# Patient Record
Sex: MALE | Race: WHITE
[De-identification: names, ages, dates, MRNs, and addresses within clinical notes are randomized per-mention and may not be internally consistent; named-entity substitution may affect disease eponyms.]

---

## 2018-10-24 ENCOUNTER — HOSPITAL ENCOUNTER (INPATIENT)
Dept: HOSPITAL 12 - SRC | Age: 38
LOS: 6 days | Discharge: NURSING, REHAB OR LONG-TERM CARE | DRG: 895 | End: 2018-10-30
Attending: INTERNAL MEDICINE | Admitting: INTERNAL MEDICINE
Payer: COMMERCIAL

## 2018-10-24 VITALS — DIASTOLIC BLOOD PRESSURE: 65 MMHG | SYSTOLIC BLOOD PRESSURE: 112 MMHG

## 2018-10-24 VITALS — HEIGHT: 73 IN | WEIGHT: 174 LBS | BODY MASS INDEX: 23.06 KG/M2

## 2018-10-24 DIAGNOSIS — F17.210: ICD-10-CM

## 2018-10-24 DIAGNOSIS — E87.1: ICD-10-CM

## 2018-10-24 DIAGNOSIS — F41.1: ICD-10-CM

## 2018-10-24 DIAGNOSIS — R73.9: ICD-10-CM

## 2018-10-24 DIAGNOSIS — R00.1: ICD-10-CM

## 2018-10-24 DIAGNOSIS — E88.09: ICD-10-CM

## 2018-10-24 DIAGNOSIS — F15.21: ICD-10-CM

## 2018-10-24 DIAGNOSIS — Z79.899: ICD-10-CM

## 2018-10-24 DIAGNOSIS — E83.51: ICD-10-CM

## 2018-10-24 DIAGNOSIS — F11.23: Primary | ICD-10-CM

## 2018-10-24 DIAGNOSIS — F90.9: ICD-10-CM

## 2018-10-24 DIAGNOSIS — B19.20: ICD-10-CM

## 2018-10-24 DIAGNOSIS — F32.9: ICD-10-CM

## 2018-10-24 DIAGNOSIS — F13.239: ICD-10-CM

## 2018-10-24 DIAGNOSIS — F15.23: ICD-10-CM

## 2018-10-24 DIAGNOSIS — Z91.89: ICD-10-CM

## 2018-10-24 LAB
ALP SERPL-CCNC: 53 U/L (ref 50–136)
ALT SERPL W/O P-5'-P-CCNC: 67 U/L (ref 16–63)
AST SERPL-CCNC: 24 U/L (ref 15–37)
BASOPHILS # BLD AUTO: 0 K/UL (ref 0–8)
BASOPHILS NFR BLD AUTO: 0.6 % (ref 0–2)
BILIRUB SERPL-MCNC: 0.4 MG/DL (ref 0.2–1)
BUN SERPL-MCNC: 14 MG/DL (ref 7–18)
CHLORIDE SERPL-SCNC: 100 MMOL/L (ref 98–107)
CO2 SERPL-SCNC: 29 MMOL/L (ref 21–32)
CREAT SERPL-MCNC: 1.1 MG/DL (ref 0.6–1.3)
EOSINOPHIL # BLD AUTO: 0.5 K/UL (ref 0–0.7)
EOSINOPHIL NFR BLD AUTO: 9.5 % (ref 0–7)
ETHANOL SERPL-MCNC: < 3 MG/DL (ref 0–0)
GLUCOSE SERPL-MCNC: 136 MG/DL (ref 74–106)
HCT VFR BLD AUTO: 38.7 % (ref 36.7–47.1)
HGB BLD-MCNC: 13.1 G/DL (ref 12.5–16.3)
LYMPHOCYTES # BLD AUTO: 2.3 K/UL (ref 20–40)
LYMPHOCYTES NFR BLD AUTO: 43.8 % (ref 20.5–51.5)
MAGNESIUM SERPL-MCNC: 2 MG/DL (ref 1.8–2.4)
MCH RBC QN AUTO: 32.1 UUG (ref 23.8–33.4)
MCHC RBC AUTO-ENTMCNC: 34 G/DL (ref 32.5–36.3)
MCV RBC AUTO: 94.5 FL (ref 73–96.2)
MONOCYTES # BLD AUTO: 0.3 K/UL (ref 2–10)
MONOCYTES NFR BLD AUTO: 5.7 % (ref 0–11)
NEUTROPHILS # BLD AUTO: 2.2 K/UL (ref 1.8–8.9)
NEUTROPHILS NFR BLD AUTO: 40.4 % (ref 38.5–71.5)
PLATELET # BLD AUTO: 285 K/UL (ref 152–348)
POTASSIUM SERPL-SCNC: 3.8 MMOL/L (ref 3.5–5.1)
RBC # BLD AUTO: 4.09 MIL/UL (ref 4.06–5.63)
TSH SERPL DL<=0.005 MIU/L-ACNC: 2.05 MIU/ML (ref 0.36–3.74)
WBC # BLD AUTO: 5.3 K/UL (ref 3.6–10.2)
WS STN SPEC: 7.3 G/DL (ref 6.4–8.2)

## 2018-10-24 PROCEDURE — G0480 DRUG TEST DEF 1-7 CLASSES: HCPCS

## 2018-10-24 PROCEDURE — HZ2ZZZZ DETOXIFICATION SERVICES FOR SUBSTANCE ABUSE TREATMENT: ICD-10-PCS | Performed by: INTERNAL MEDICINE

## 2018-10-24 RX ADMIN — GABAPENTIN SCH MG: 400 CAPSULE ORAL at 23:53

## 2018-10-24 NOTE — NUR
ADMISSION NOTE:

The patient admitted on unit for Benzodiazepines and Opioid withdrawal on 10/24/2018 at 
2113.  Patient is alert and oriented x4.  Patient is cooperative, with steady gate, and soft 
clear speech.  Patient noted with moderate intoxication, drowsiness, restlessness, and 
fatigue.  Patient reports NKA.  Patient denies Fall or Seizures History.   Patient reports 
Past Medical History: Anxiety, Depression,  history of overdose with last episode 5 days ago 
:"Paramedics came to my house, and administered Narcan to me".   



Substance Use History:



1. Xanax- patient started using prescribed Xanax 7 months ago-  Currently using 2 mg PO 
every day for the past 7 months.  Last dose was 4 mg on 10/24/2018 at 1500.



2. Adderall - patient started using prescribed Adderall 7 months ago-  Currently using 30 mg 
PO every day for the past 7 months.  Last dose was 30 mg on 10/24/2018 at 1500.



3. Subutex - patient started using prescribed Subutex 7 months ago-  Currently using 8 mg PO 
every day for the past 7 months.  Last dose was 8 mg on 10/24/2018 at 1500.



4. Heroin - patient started using Heroin via IV 10 years ago-  Currently using 1 gram via IV 
 occasionally for the past two weeks.  Last dose was I gram via IV on 10/19/2018. 



5. Methamphetamine - patient started using Methamphetamine via IV 12 years ago-  Currently 
using 1 gram via IV  occasionally for the past two weeks.  Last dose was I gram via IV on 
10/19/2018. 



Patient reports Treatment History:



1."[facility]" June,2018 for 7 days, [place], [place],



2."[facility]" January,2018 for 30 days, [place], [place].



3."[facility]", November, 2017 for 22 days. [place], [place].



4."[facility]", February, 2017 for 45 days, [place]. 



Patient verbalized, ""My longest period of sobriety was seven months: 01/2015 to 08/2015".



Patient reports, "I am feeling very sick if I am not using drugs, I experience anxiety, 
agitation, depression, irritation, nervousness, abdominal pain, nausea, vomiting, body pain, 
headache, dizziness, tremors, restlessness, and fatigue".  Patient explained, "I came today 
because my life now is out of control: I am lost everything, and I am scared now of losing 
my life.  I want to get sober to be productive and to save my life".  



Patient reports smoking history, "First smoking was in 1997.  Currently I am smoking 1 pack 
every day".

Smoking  cessation education provided to patient.  Patient verbalized understanding.



Patient has no Primary Care Physician and Psychiatrist.  Patient not brought home 
medications.



Initial body assessment done.  VS: T: 97.6, HR: 69, RR: 18, RA O2Sat: 98%, BP: 112/65, pain  
level:  0/10.  Respirations are even and unlabored.  Abdomen is soft, non-tender, bowel 
sounds are active in all four quadrants.  Skin check done: Skin is warm, and dry to touch.  
Patient has right big toe and  right ankle lateral abrasions.  



UDS Labs collected and sent to lab.  Blood labs done.



Encourage to intake fluids as tolerated.  Encouraged to attend group activates.  Safe and 
calm environment provided.  All needs met.  Safety measures in place: Call light within 
reach, bed is locked in the lowest position, padded bed rails up bilaterally.  Will continue 
to monitor. 




-------------------------------------------------------------------------------

Addendum: 10/25/18 at 1441 by MELISSA LAW RN

-------------------------------------------------------------------------------

Update on Substance Use/Treatment History:



Patient's UDS resulted positive for barbiturates. Per patient, he has not knowingly taken 
barbiturates. However, he states he was in an IOP in [place] (can't recall name) 2-3 weeks 
ago, and he believes he might have been given phenobarbital while there. He claims he was 
also given his prescription Adderall and Subutex while there.

## 2018-10-24 NOTE — NUR
PRE-ADMISSION NOTE

Patient seen in intake office.  First assessment done.  Patient is alert and oriented x4.  
Patient is cooperative, with steady gate, and soft clear speech.  Patient noted with 
moderate intoxication, drowsiness, restlessness, and fatigue.  VS: T: 97.6, HR: 69, RR: 18, 
RA O2Sat: 98%, BP: 112/65, pain  level:  0/10.  Patient reports NKA.  Patient denies Fall or 
Seizures History.  Policy and rights explained to patient,  patient verbalized 
understanding. Full assessment will be done on unit.

## 2018-10-25 VITALS — SYSTOLIC BLOOD PRESSURE: 92 MMHG | DIASTOLIC BLOOD PRESSURE: 60 MMHG

## 2018-10-25 VITALS — DIASTOLIC BLOOD PRESSURE: 77 MMHG | SYSTOLIC BLOOD PRESSURE: 99 MMHG

## 2018-10-25 VITALS — SYSTOLIC BLOOD PRESSURE: 92 MMHG | DIASTOLIC BLOOD PRESSURE: 57 MMHG

## 2018-10-25 VITALS — SYSTOLIC BLOOD PRESSURE: 99 MMHG | DIASTOLIC BLOOD PRESSURE: 64 MMHG

## 2018-10-25 VITALS — SYSTOLIC BLOOD PRESSURE: 108 MMHG | DIASTOLIC BLOOD PRESSURE: 68 MMHG

## 2018-10-25 LAB
AMPHETAMINES UR QL SCN>1000 NG/ML: POSITIVE
BARBITURATES UR QL SCN: POSITIVE
COCAINE UR QL SCN: NEGATIVE
OPIATES UR QL SCN: NEGATIVE
PCP UR QL SCN>25 NG/ML: NEGATIVE
THC UR QL SCN>50 NG/ML: NEGATIVE

## 2018-10-25 PROCEDURE — HZ41ZZZ GROUP COUNSELING FOR SUBSTANCE ABUSE TREATMENT, BEHAVIORAL: ICD-10-PCS

## 2018-10-25 PROCEDURE — HZ31ZZZ INDIVIDUAL COUNSELING FOR SUBSTANCE ABUSE TREATMENT, BEHAVIORAL: ICD-10-PCS

## 2018-10-25 RX ADMIN — BUPRENORPHINE HYDROCHLORIDE SCH MG: 2 TABLET SUBLINGUAL at 11:16

## 2018-10-25 RX ADMIN — DIAZEPAM SCH MG: 10 TABLET ORAL at 14:21

## 2018-10-25 RX ADMIN — THERA TABS SCH UDTAB: TAB at 09:46

## 2018-10-25 RX ADMIN — GABAPENTIN SCH MG: 400 CAPSULE ORAL at 17:07

## 2018-10-25 RX ADMIN — HYDROXYZINE PAMOATE PRN MG: 25 CAPSULE ORAL at 23:21

## 2018-10-25 RX ADMIN — BUPRENORPHINE HYDROCHLORIDE SCH MG: 2 TABLET SUBLINGUAL at 20:49

## 2018-10-25 RX ADMIN — DIAZEPAM SCH MG: 10 TABLET ORAL at 09:46

## 2018-10-25 RX ADMIN — METHOCARBAMOL TABLETS PRN MG: 750 TABLET, COATED ORAL at 23:21

## 2018-10-25 RX ADMIN — GABAPENTIN SCH MG: 400 CAPSULE ORAL at 09:45

## 2018-10-25 RX ADMIN — OXCARBAZEPINE SCH MG: 300 TABLET, FILM COATED ORAL at 17:06

## 2018-10-25 RX ADMIN — DIAZEPAM SCH MG: 10 TABLET ORAL at 20:49

## 2018-10-25 RX ADMIN — OXCARBAZEPINE SCH MG: 300 TABLET, FILM COATED ORAL at 09:46

## 2018-10-25 RX ADMIN — GABAPENTIN SCH MG: 400 CAPSULE ORAL at 12:20

## 2018-10-25 NOTE — NUR
START OF SHIFT NOTE:

The patient, 38 year male, continues 4 Day Valium and 3 Days Subutex Tapers ordered for 
Benzodiazepines and Opioid withdrawal. Today is first day.  Patient tolerated well.  
Withdrawal symptoms will be monitoring closely.  Patient is alert and oriented x4, calm and 
cooperative.  Mood anxious, and flat affect. The most recent COWS=10 at 1600, CIWA=10 at 
1600: Patient noted with moderate symptoms of withdrawal such as anxiety, agitation, 
irritability, nervousness, nasal congestion, generalized mild body aches, abdominal cramps, 
bilateral tremors, that can be felt  but not observe, restlessness, and fatigue.  PRN 
Medications was not administrated during day shift, per day shift nurse report.  Patient 
attended group activities.  Encouraged to intake fluids as tolerated.  Encouraged to attend 
group activities.  Safe and calm environment provide.  All needs met.  Safety measures in 
place: Call light within reach, bed is in lowest position and locked, padded bed rails up 
x2.  Will continue to monitor closely.

## 2018-10-25 NOTE — NUR
COWS CIWA ASSESSMENT



cows=7 ciwa=10. Anxious and restless. Not able to lay/sit still. Pressured speech. Bilateral 
hand tremors. Irritable and agitated. Complaints of generalized discomfort.

## 2018-10-25 NOTE — NUR
LATE MEDICATION



Subutex dose given. Cows=14. Anxious and restless. Very irritable and agitated. Complaints 
of intermittent chills and perspiration. Body aches. Generalized discomfort.

## 2018-10-25 NOTE — NUR
END OF SHIFT NOTE:

This report given for patient, 38 year male, admitted for medical supervision from 
Benzodiazepines and Opioid withdrawal on 10/24/2018 at 2113, ordered 4 Day Valium taper, and 
3 day Subutex Taper.  Patient is alert and oriented x4, cooperative, soft clear speech, and 
with steady gate.  Patient is fully ambulatory with low VTE.  Patient admitted with moderate 
intoxication, drowsiness, restlessness, and fatigue.  COWS/CIWA deferred  at 0400 for sleep. 
 Patient will be assessed when patient is awake.  Patient reports NKA.  Patient denies Fall 
or Seizures History.  Past Medical History: Anxiety, Depression, History of overdose with 
last episode 5 days ago.  VSWNL.  Respirations are even and unlabored.  Abdomen is soft and 
non-tender, bowel sounds are active in all four quadrants.  Skin is warm, and dry to touch.  
Patient has right big toe and  right ankle lateral abrasions.  Patient slept for 5 hours, 
intake 500 ml, output: Voided x1.  No PRN Medications administrated.  Encouraged to intake 
fluids as tolerated.   Encouraged to attend group activates.  Safe and calm environment 
provided.  All needs met.  Safety measures in place: Call light within reach, bed is locked 
in the lowest position, bed rails up bilaterally.  Endorsed to day shift nurse.

## 2018-10-25 NOTE — NUR
COWS/CIWA ASSESSMENT

COWS=12, CIWA=11.  Patient noted anxious, agitated, irritated, nervousness, with bilateral 
tremors, sweating, restlessness, myalgia, and fatigue.  Scheduled and PRN Medications was 
discussed with patient, and will be administered for managed withdrawal symptoms, as 
ordered.  Safe and calm environment provided.  All needs met.  Safety measures in place: 
Call light within reach, bed is locked in the lowest position,  padded bed rails up 
bilaterally.  Endorsed by outgoing day shift nurse.  Will continue to monitor.

## 2018-10-25 NOTE — NUR
END OF SHIFT



Pt 37 y/o male admitted for benzo and opioid withdrawal. Pt alert and oriented to name, 
place, and time. Perrla. Skin warm and moist to touch. Respirations even unlabored. Appears 
disheveled with hair uncombed. Empty drink bottles and food wrappings scattered throughout 
the room. Encouraged to maintain hygiene. Anxious and restless. Pacing. Isolative with 
minimal peer interaction. Pt attended group activity. Pt is on a 4 day valium taper and is 
on day 1. Pt also on a 3 day subutex taper and is on day 1. Ciwa =10 @1600. Cow=10 @1600. 
Bed on lowest position with side rails x2 up for safety. Call light within reach.

## 2018-10-25 NOTE — NUR
START OF SHIFT



Pt 37 y/o male admitted for benzo and opioid withdrawal. Pt received in room on bed with 
eyes closed resting, but easily arousable to name. Pt alert and oriented to name, place, and 
time. perrla. Skin warm and moist to touch. Respirations even unlabored. Appears disheveled 
with hair uncombed. Clothes scattered throughout the room. Encouraged to maintain hygiene. 
Anxious and restless. Fidgety. Low motivation for self care. It was reported that pt slept 
for 5 hours last night. Last cows and ciwa deferred @ 0400. pt is on a 4 day valium taper 
and is on day1. Pt also on a 3 day subutex taper and is on day 1. Bed on lowest position 
with side rails x2 up for safety. Call light within reach.

## 2018-10-25 NOTE — NUR
PRN VISTARIL PO AND PRN ROBAXIN PO ADMINISTRATION

PRN Vistaril 50 mg PO administered for anxiety at 2321, PRN Robaxin 750 mg PO administered 
for myalgia at 2321 as ordered.   Patient tolerated well.  Encouraged to intake fluids as 
tolerated.  Re-assessment will be done in one hour.  Safe and calm environment provide.  All 
needs met.  Safety measures in place: Call light within reach, bed is in lowest position and 
locked, padded bed rails up x2.  Will continue to monitor closely.

## 2018-10-25 NOTE — NUR
COWS CIWA ASSESSMENT



cows=10 ciwa=10. Anxious and restless. Pressured speech. Irritable and agitated. Bilateral 
hand tremors noted. Complaints of intermittent perspiration. Complaints of generalized 
discomfort.

## 2018-10-25 NOTE — NUR
COWS CIWA ASSESSMENT



cows=10 ciwa=10. Bilateral hand tremors noted. Anxious and restless. Pressured speech. 
Fidgety. Complaints of intermittent perspiration and generalized discomfort.

## 2018-10-25 NOTE — NUR
VS REFUSED, COWS/CIWA DEFERRED

VS refused and COWS/CIWA deferred for sleep.  Patient will be assessed when patient is 
awake.  Respirations are unlabored and even.  RR=14.  Safe and calm environment provided.  
All needs met.  Safety measures in place: Call light within reach, bed is locked in the 
lowest position, and  padded bed rails up bilaterally.  Will continue to monitor.

## 2018-10-26 VITALS — DIASTOLIC BLOOD PRESSURE: 67 MMHG | SYSTOLIC BLOOD PRESSURE: 101 MMHG

## 2018-10-26 VITALS — SYSTOLIC BLOOD PRESSURE: 90 MMHG | DIASTOLIC BLOOD PRESSURE: 62 MMHG

## 2018-10-26 VITALS — SYSTOLIC BLOOD PRESSURE: 117 MMHG | DIASTOLIC BLOOD PRESSURE: 62 MMHG

## 2018-10-26 VITALS — SYSTOLIC BLOOD PRESSURE: 106 MMHG | DIASTOLIC BLOOD PRESSURE: 84 MMHG

## 2018-10-26 VITALS — SYSTOLIC BLOOD PRESSURE: 106 MMHG | DIASTOLIC BLOOD PRESSURE: 68 MMHG

## 2018-10-26 RX ADMIN — BUPRENORPHINE HYDROCHLORIDE SCH MG: 2 TABLET SUBLINGUAL at 09:00

## 2018-10-26 RX ADMIN — OXCARBAZEPINE SCH MG: 300 TABLET, FILM COATED ORAL at 16:38

## 2018-10-26 RX ADMIN — GABAPENTIN SCH MG: 400 CAPSULE ORAL at 12:51

## 2018-10-26 RX ADMIN — DIAZEPAM SCH MG: 5 TABLET ORAL at 16:38

## 2018-10-26 RX ADMIN — THERA TABS SCH UDTAB: TAB at 09:00

## 2018-10-26 RX ADMIN — BUPRENORPHINE HYDROCHLORIDE SCH MG: 2 TABLET SUBLINGUAL at 20:50

## 2018-10-26 RX ADMIN — DIAZEPAM SCH MG: 5 TABLET ORAL at 09:00

## 2018-10-26 RX ADMIN — OXCARBAZEPINE SCH MG: 300 TABLET, FILM COATED ORAL at 09:00

## 2018-10-26 RX ADMIN — DIAZEPAM SCH MG: 5 TABLET ORAL at 20:50

## 2018-10-26 RX ADMIN — DIAZEPAM SCH MG: 5 TABLET ORAL at 12:51

## 2018-10-26 RX ADMIN — GABAPENTIN SCH MG: 400 CAPSULE ORAL at 16:38

## 2018-10-26 RX ADMIN — BUPRENORPHINE HYDROCHLORIDE SCH MG: 2 TABLET SUBLINGUAL at 14:12

## 2018-10-26 RX ADMIN — GABAPENTIN SCH MG: 400 CAPSULE ORAL at 09:00

## 2018-10-26 NOTE — NUR
COWS CIWA ASSESSMENT



cows=12 ciwa=10. Anxious and restless. Pressured speech. Bilateral hand tremors noted. 
Irritable. Intermittent perspiration. .

## 2018-10-26 NOTE — NUR
START OF SHIFT



Pt 37 y/o male admitted for benzo and opioid withdrawal. Pt received in room on with eyes 
closed resting, but arousable to name. Pt alert and oriented to name, place, and time. 
Perrla. Respirations even and unlabored. Appears disheveled and unkempt. Empty drink bottles 
scattered throughout the room. Encouraged to maintain hygiene. Anxious and restless. 
Pressured speech. Complaints of intermittent perspiration and chills. Fidgety. Bilateral 
hand tremors. Complaints of generalized discomfort. It was reported that pt slept for 7 
hours last night. Last ciwa=12 cows=12 @0000. Pt is on a 4 day valium taper and is on day 2. 
Pt also on a 3 day subutex taper and is on day 2. Cows=12 ciwa=10 @ 0800. Bed on lowest 
position with side rails x 2 up for safety. Call light within reach.

## 2018-10-26 NOTE — NUR
Start of Shift



Received 38 year male patient admitted to [facility] for medically supervised 
withdrawal from Benzodiazepines and Opiates. Currently on day 2 of 4 day Valium and 4 day 
Subutex tapers which he is tolerating well. Last COWS 12/CIWA 10 at 1600. Pt did not receive 
any PRN medications on day shift. Pt is awake, alert, and oriented. Pt showered. Pt anxious, 
agitated, pacing, withdrawn, and isolative, easily distracted and cannot sit still. 
Encouraged pt to participate in group, declined. States he spoke to his girlfriend and he 
doesnt want to be around anybody right now. Bed is low, side rails up x 2, and call bell in 
reach. Will continue to monitor.

## 2018-10-26 NOTE — NUR
COWS CIWA ASSESSMENT



cows=12 ciwa=10. Bilateral hand tremors. Intermittent perspiration and chills. Body aches. 
Complaints of generalized body aches. Anxious and restless. Pressured speech.

## 2018-10-26 NOTE — NUR
CIWA 13/COWS 13



Pt up pacing, anxious, agitated, easily distracted, withdrawn, and unable to sit still.

## 2018-10-26 NOTE — NUR
COWS/CIWA ASSESSMENT

COWS=12,CIWA=12.  Patient noted anxious, agitated, irritated, nervousness, with bilateral 
tremors, sweating, myalgia, restlessness, and fatigue.   PRN Medications discussed with 
patient, and will be administered for anxiety and myalgia, as ordered.   Safe and calm 
environment provided.  All needs met.  Safety measures in place: Call light within reach, 
bed is locked in the lowest position,  padded bed rails up bilaterally.  Endorsed by 
outgoing day shift nurse.  Will continue to monitor.

COWS/CIWA ASSESSMENT

COWS=12, CIWA=12.  Patient noted anxious, agitated, irritated, nervousness, with bilateral 
tremors, sweating, myalgia, restlessness, and fatigue.  PRN Vistaril 50 mg PO administered 
for anxiety at 2321, PRN Robaxin 750 mg PO administered for myalgia at 2321 as ordered 
Patient tolerated well.  Encouraged to intake fluids as tolerated.  Re-assessment will be 
done in one hour.  Safe and calm environment provide.  All needs met.  Safety measures in 
place: Call light within reach, bed is in lowest position and locked, padded bed rails up 
x2.  Will continue to monitor closely.

## 2018-10-26 NOTE — NUR
VS REFUSED, COWS/CIWA DEFERRED

VS refused and COWS/CIWA deferred for sleep.   Patient will be assessed when patient is 
awake.  Respirations are unlabored and even.  RR=16.  Safe and calm environment provided.  
All needs met.  Safety measures in place: Call light within reach, bed is locked in the 
lowest position, and  padded bed rails up bilaterally.  Will continue to monitor.

## 2018-10-26 NOTE — NUR
END OF SHIFT



Pt 37 y/o male admitted for benzo and opioid withdrawal. Pt alert and oriented to name, 
place, and time. Perrla. Skin warm and moist to touch. Respirations even unlabored. Appears 
disheveled with hair uncombed. Empty drink bottles and food wrappings scattered throughout 
the room. Encouraged to maintain hygiene. Anxious and restless. Pacing. Bilateral hand 
tremors. Pressured speech. Irritable. Minimal peer interaction. Pt attended group activity. 
Pt is on a 4 day valium taper and is on day 2. Pt also on a 3 day subutex taper and is on 
day 2. Ciwa =10 @1600. Cow=12 @1600. Bed on lowest position with side rails x2 up for 
safety. Call light within reach.

## 2018-10-26 NOTE — NUR
END OF SHIFT NOTE:

This report given for patient, 38 year old male.   The patient presented for Benzodiazepines 
and Opioid withdrawal, second day continues ordered 4 Day Valium and 3 Days Subutex Tapers, 
which tolerated well.  Withdrawal symptoms and VS was closely monitored.  Initial  COWS=12, 
CIWA=11 at 2000.  Last COWS=12,CIWA=12 at 0000.  VS refused and COWS/CIWA deferred  at 0400 
for  patient sleep.  During night shift  patient experienced moderate symptoms of withdrawal 
such as anxiety, agitation, irritability,  nervousness, nasal congestion, generalized mild 
body aches, stomach cramps,  bilateral tremors, restlessness, sweating, myalgia, and 
fatigue.  PRN Vistaril 50 mg PO administered for anxiety at 2321 and  PRN Robaxin 750 mg PO 
administered for myalgia at 2321, and were  effective.  Patient slept for 7 hours, intake 
500 ml, and voided x1.  Encouraged to intake fluids as tolerated.  Encouraged to attend 
group activities.  Safe and calm environment provide.  All needs met.  Safety measures in 
place: Call light within reach, bed is in lowest position and locked, padded bed rails up 
x2.   Endorsed to day shift nurse.

## 2018-10-27 VITALS — DIASTOLIC BLOOD PRESSURE: 56 MMHG | SYSTOLIC BLOOD PRESSURE: 100 MMHG

## 2018-10-27 VITALS — DIASTOLIC BLOOD PRESSURE: 67 MMHG | SYSTOLIC BLOOD PRESSURE: 107 MMHG

## 2018-10-27 VITALS — SYSTOLIC BLOOD PRESSURE: 113 MMHG | DIASTOLIC BLOOD PRESSURE: 78 MMHG

## 2018-10-27 VITALS — SYSTOLIC BLOOD PRESSURE: 110 MMHG | DIASTOLIC BLOOD PRESSURE: 62 MMHG

## 2018-10-27 RX ADMIN — OXCARBAZEPINE SCH MG: 300 TABLET, FILM COATED ORAL at 09:14

## 2018-10-27 RX ADMIN — GABAPENTIN SCH MG: 400 CAPSULE ORAL at 09:14

## 2018-10-27 RX ADMIN — DIAZEPAM SCH MG: 5 TABLET ORAL at 20:57

## 2018-10-27 RX ADMIN — DIAZEPAM SCH MG: 5 TABLET ORAL at 09:14

## 2018-10-27 RX ADMIN — GABAPENTIN SCH MG: 400 CAPSULE ORAL at 16:01

## 2018-10-27 RX ADMIN — GABAPENTIN SCH MG: 400 CAPSULE ORAL at 12:19

## 2018-10-27 RX ADMIN — OXCARBAZEPINE SCH MG: 300 TABLET, FILM COATED ORAL at 16:01

## 2018-10-27 RX ADMIN — DIAZEPAM SCH MG: 5 TABLET ORAL at 15:51

## 2018-10-27 RX ADMIN — THERA TABS SCH UDTAB: TAB at 09:14

## 2018-10-27 NOTE — NUR
COWS/CIWA Assessment



COWS of 11and CIWA of 12. Pt. in room presenting with anxiety, restlessness, tremors, 
diaphoresis and flushed facial skin. Pt. compliant with treatment plan and medication 
administration. Will continue to monitor pt.'s behavior for safety.

## 2018-10-27 NOTE — NUR
CIWA/COWS deferred/Vitals refused



CIWA/COWS deferred and Vitals refused. Pt is resting with eyes closed. Respirations even and 
unlabored. Continue to monitor.

## 2018-10-27 NOTE — NUR
End of Shift



Endorsing 38 year male patient admitted to [facility] for medically supervised 
withdrawal from Benzodiazepines and Opiates. Currently on day 3 of 4 day Valium and day 3 of 
3 day Subutex taper which he is tolerating well. Last COWS 13/CIWA 13 at 2000. Pt did not 
receive any PRN medications on night shift. Pt did participate in the evening activities. PO 
intake 950 ml, voided x 2, BM x 0, and slept 6 hours. Pt in bed resting with eyes closed. 
Respirations are even and unlabored. Bed is low, side rails up x 2, and call bell in reach.

## 2018-10-27 NOTE — NUR
End of Shift



Pt. is a 39 y/o male admitted for the medically managed withdrawal from Benzodiazepines and 
Opiate. Pt. was also using methamphetamines, along with his other drug use. Pt. was placed 
on a 4 day valium taper, and a 3 day subutex taper to manage his withdrawal symptoms. Pt. 
completed his subutex taper this morning. Throughout shift pt. presented with anxiety, 
restlessness, agitation, and tremors. Pt. compliant with medication regiment and treatment 
plan. Safety measures in place. Will endorse pt.s care to oncoming shift.

## 2018-10-27 NOTE — NUR
Start of Shift



Received 38 year old male patient admitted 10/24/18 to [facility] for 
medically supervised withdrawal from Benzodiazepines and Opiates. Pt completed 3 day Subutex 
taper which he tolerated well. Continues on day 3 of 4 day Valium taper, tolerating well. 
Last CIWA 14 and COWS 11 @ 1600. Pt did not receive any PRN medications on day shift. Pt 
unkempt in dirty room withdrawn, isolative, noted with anxiety, agitation, flushed, 
distracted, and guarded. Bed low, side rails up x 2, and call bell in reach. Will continue 
encourage participation and monitor.

## 2018-10-27 NOTE — NUR
COWS/CIWA Assessment



COWS of 12 and CIWA of 12. Pt. in room presenting with anxiety, restlessness, tremors, 
diaphoresis and flushed facial skin. Will give medications as ordered. Will continue to 
monitor pt.'s behavior for safety.

## 2018-10-27 NOTE — NUR
Start of Shift



Pt. is a 39 y/o male admitted for the medically managed withdrawal from Benzodiazepines and 
Opiate. Pt. was also using methamphetamines, along with his other drug use. Pt. was placed 
on a 4 day valium taper, and a 3 day subutex taper to manage his withdrawal symptoms. 
Endorse from previous shift pt. presented with anxiety, restlessness, agitation, and 
tremors. Received pt. in room. Pt. laying in bed with eyes closed. No signs of distress 
noted. Safety measures in place. Will continue to monitor pt.s behavior for safety.

## 2018-10-27 NOTE — NUR
COWS/CIWA Assessment



COWS of 11 and CIWA of 12. Pt. in room presenting with anxiety, restlessness, tremors, 
diaphoresis and flushed facial skin. Pt. compliant with medication administration and 
treatment plan. Will continue to monitor pt.'s behavior for safety.

## 2018-10-28 VITALS — SYSTOLIC BLOOD PRESSURE: 99 MMHG | DIASTOLIC BLOOD PRESSURE: 61 MMHG

## 2018-10-28 VITALS — SYSTOLIC BLOOD PRESSURE: 113 MMHG | DIASTOLIC BLOOD PRESSURE: 77 MMHG

## 2018-10-28 VITALS — DIASTOLIC BLOOD PRESSURE: 74 MMHG | SYSTOLIC BLOOD PRESSURE: 111 MMHG

## 2018-10-28 VITALS — DIASTOLIC BLOOD PRESSURE: 63 MMHG | SYSTOLIC BLOOD PRESSURE: 108 MMHG

## 2018-10-28 LAB
ALP SERPL-CCNC: 41 U/L (ref 50–136)
ALT SERPL W/O P-5'-P-CCNC: 58 U/L (ref 16–63)
AST SERPL-CCNC: 24 U/L (ref 15–37)
BASOPHILS # BLD AUTO: 0 K/UL (ref 0–8)
BASOPHILS NFR BLD AUTO: 0.8 % (ref 0–2)
BILIRUB DIRECT SERPL-MCNC: 0.1 MG/DL (ref 0–0.2)
BILIRUB SERPL-MCNC: 0.4 MG/DL (ref 0.2–1)
BUN SERPL-MCNC: 18 MG/DL (ref 7–18)
CHLORIDE SERPL-SCNC: 105 MMOL/L (ref 98–107)
CO2 SERPL-SCNC: 31 MMOL/L (ref 21–32)
CREAT SERPL-MCNC: 1.1 MG/DL (ref 0.6–1.3)
EOSINOPHIL # BLD AUTO: 0.3 K/UL (ref 0–0.7)
EOSINOPHIL NFR BLD AUTO: 7.1 % (ref 0–7)
GLUCOSE SERPL-MCNC: 85 MG/DL (ref 74–106)
HCT VFR BLD AUTO: 39.5 % (ref 36.7–47.1)
HGB BLD-MCNC: 13.8 G/DL (ref 12.5–16.3)
LYMPHOCYTES # BLD AUTO: 1.7 K/UL (ref 20–40)
LYMPHOCYTES NFR BLD AUTO: 41.2 % (ref 20.5–51.5)
MCH RBC QN AUTO: 33.3 UUG (ref 23.8–33.4)
MCHC RBC AUTO-ENTMCNC: 35 G/DL (ref 32.5–36.3)
MCV RBC AUTO: 95.3 FL (ref 73–96.2)
MONOCYTES # BLD AUTO: 0.3 K/UL (ref 2–10)
MONOCYTES NFR BLD AUTO: 8.6 % (ref 0–11)
NEUTROPHILS # BLD AUTO: 1.7 K/UL (ref 1.8–8.9)
NEUTROPHILS NFR BLD AUTO: 42.3 % (ref 38.5–71.5)
PLATELET # BLD AUTO: 266 K/UL (ref 152–348)
POTASSIUM SERPL-SCNC: 4.2 MMOL/L (ref 3.5–5.1)
RBC # BLD AUTO: 4.14 MIL/UL (ref 4.06–5.63)
WBC # BLD AUTO: 4 K/UL (ref 3.6–10.2)
WS STN SPEC: 6.8 G/DL (ref 6.4–8.2)

## 2018-10-28 RX ADMIN — DIAZEPAM SCH MG: 5 TABLET ORAL at 21:37

## 2018-10-28 RX ADMIN — GABAPENTIN SCH MG: 400 CAPSULE ORAL at 12:09

## 2018-10-28 RX ADMIN — OXCARBAZEPINE SCH MG: 300 TABLET, FILM COATED ORAL at 16:05

## 2018-10-28 RX ADMIN — THERA TABS SCH UDTAB: TAB at 08:41

## 2018-10-28 RX ADMIN — OXCARBAZEPINE SCH MG: 300 TABLET, FILM COATED ORAL at 08:41

## 2018-10-28 RX ADMIN — HYDROXYZINE PAMOATE PRN MG: 25 CAPSULE ORAL at 23:55

## 2018-10-28 RX ADMIN — GABAPENTIN SCH MG: 400 CAPSULE ORAL at 08:41

## 2018-10-28 RX ADMIN — GABAPENTIN SCH MG: 400 CAPSULE ORAL at 16:05

## 2018-10-28 RX ADMIN — DIAZEPAM SCH MG: 5 TABLET ORAL at 08:41

## 2018-10-28 NOTE — NUR
END OF SHIFT NOTE

Gave report to night nurse, 38 year old male admitted for Benzo/Opioids withdrawal. Patient 
completed his 3 days Subutex taper and continues with 4 days Valium taper tolerating well. 
Patient presented with flat facial expression, anxious, agitated, disheveled, empty bottles 
and food wraps on the floor, odors, encourage to maintain hygiene. Encourage patient to 
attend groups activities to learn new coping skills. Last noted CIWA 10, COWS-8. All needs 
attended to promptly. Endorse patient to night nurse in stable condition.

## 2018-10-28 NOTE — NUR
End of Shift



Endorsing 38 year old male patient admitted 10/24/18 to [facility] for 
medically supervised withdrawal from Benzodiazepines and Opiates. Pt currently on day 4 of 4 
day Valium taper which he is tolerating well. Last CIWA 14 and COWS 11 @ 2000. Pt did not 
receive any PRN medications on night shift. Pt remained in room with the exception to smoke. 
PO intake 1787 ml, voided x 3, BM x 1, and slept 6 hours. Bed low, side rails up x 2, and 
call bell in reach.

## 2018-10-28 NOTE — NUR
Start of Shift



Received 38 year old male patient admitted 10/24/18 to [facility] for 
medically supervised withdrawal from Benzodiazepines and Opiates. Pt currently on day 4 of a 
4 day Valium taper which he is tolerating well. Last CIWA 10 and COWS 8 @ 1600. Pt did not 
receive any PRN medications on day shift. Pt is unkempt and odorous. Pt is anxious, 
agitated, fidgety, flat affect, and withdrawn. Is participating in activates at this time. 
Bed is low, side rails up x 2, and call bell in reach. Will continue to monitor.

## 2018-10-28 NOTE — NUR
START OF SHIFT NOTE

Received report from night nurse, 38 year old male admitted for Benzo/Opioids withdrawal. 
Patient completed his 4 days Subutex taper and continue with 4 Valium taper tolerating well. 
Per endorsement did not receive any PRN medications, Last CIWA-14, COWS-11, slept for 6 
hours. received patient alert oriented x4. Breathing normal no SOB noted. Skin intact warm 
and dry to touch. Educated patient current plan of the day and medication regimen, patient 
verbalized understanding. All safety measures in place. Will cont with plan of care.

## 2018-10-28 NOTE — NUR
CIWA/COWS ASSESSMENT

CIWA-10, COWS-8, patient presented with flat facial expression, anxious, agitated, restless, 
diaphoretic. Patient was given his schedule medication. Will cont to monitor.

## 2018-10-28 NOTE — NUR
CIWA/COWS ASSESSMENT

COWS-10, CIWA-11, patient reported anxiety, agitation, restless, bilateral hand tremors, 
runny nose, stomach cramps, chills, sweats, patient is due for schedule medications for 
managed withdrawal symptoms. Patient went down for smoke. Will cont to monitor.

## 2018-10-28 NOTE — NUR
PRN Clonidine/Vistaril



Pt anxious, agitated, restless, and pacing. PRN Clonidine and Vistaril given per order. Will 
monitor effect.

## 2018-10-28 NOTE — NUR
CIWA and COWS deferred/Vitals refused



Pt in resting with eyes closed. Respirations even and unlabored. CIWA/COWS deferred and pt 
refused vitals. Will continue to monitor.

## 2018-10-29 VITALS — DIASTOLIC BLOOD PRESSURE: 74 MMHG | SYSTOLIC BLOOD PRESSURE: 122 MMHG

## 2018-10-29 VITALS — DIASTOLIC BLOOD PRESSURE: 82 MMHG | SYSTOLIC BLOOD PRESSURE: 119 MMHG

## 2018-10-29 VITALS — DIASTOLIC BLOOD PRESSURE: 62 MMHG | SYSTOLIC BLOOD PRESSURE: 95 MMHG

## 2018-10-29 VITALS — SYSTOLIC BLOOD PRESSURE: 108 MMHG | DIASTOLIC BLOOD PRESSURE: 75 MMHG

## 2018-10-29 VITALS — SYSTOLIC BLOOD PRESSURE: 109 MMHG | DIASTOLIC BLOOD PRESSURE: 70 MMHG

## 2018-10-29 RX ADMIN — GABAPENTIN SCH MG: 400 CAPSULE ORAL at 16:36

## 2018-10-29 RX ADMIN — OXCARBAZEPINE SCH MG: 300 TABLET, FILM COATED ORAL at 08:33

## 2018-10-29 RX ADMIN — THERA TABS SCH UDTAB: TAB at 08:33

## 2018-10-29 RX ADMIN — GABAPENTIN SCH MG: 400 CAPSULE ORAL at 12:07

## 2018-10-29 RX ADMIN — HYDROXYZINE PAMOATE PRN MG: 25 CAPSULE ORAL at 22:53

## 2018-10-29 RX ADMIN — GABAPENTIN SCH MG: 400 CAPSULE ORAL at 08:33

## 2018-10-29 RX ADMIN — METHOCARBAMOL TABLETS PRN MG: 750 TABLET, COATED ORAL at 22:53

## 2018-10-29 RX ADMIN — OXCARBAZEPINE SCH MG: 300 TABLET, FILM COATED ORAL at 16:36

## 2018-10-29 NOTE — NUR
PRN Robaxin, Vistaril and one time Benadryl administration 

Patient reports anxiety, body aches and difficulty falling asleep. Will monitor for 
effectiveness

## 2018-10-29 NOTE — NUR
END OF SHIFT NOTE

Gave report to night nurse, Patient admitted for Benzo/Opioids withdrawal and completed his 
Valium and Subutex taper tolerated well. Patient received his scheduled medications patient 
did not request for any PRN medications. Patient presented with flushed face, poor eye 
contact, anhedonia, anxiety, agitation, restless, fatigue, bilateral hand tremors. Patient 
noted attending groups activities and interacting with peers. Patient was encouraged 
adequate PO fluid intake as tolerated, patient encouraged to develop coping skills and 
utilization of non pharmacological interventions. Encouraged patient to participates in 
groups activities. Vital signs WNL. Patient set for discharge in AM. Last CIWA-6, COWS-5. 
Patient denies any SI/HI. All needs attended. Endorse care to night nurse.

## 2018-10-29 NOTE — NUR
CIWA/COWS ASSESSMENT

CIWA-11, COWS-9, patient presented with flat facial expression, fatigue, anxious, agitated, 
restless, diaphoretic, bilateral hand tremors, anhedonia, unkept room. Patient was given his 
schedule medication. Will cont to monitor.

## 2018-10-29 NOTE — NUR
CIWA/COWS ASSESSMENT

CIWA-8, COWS-7, patient reported increased anxiety, agitation, restless, diaphoretic, 
bilateral hand tremors, fatigue. Will cont to monitor.

## 2018-10-29 NOTE — NUR
START OF SHIFT NOTE

Received report from night nurse, 38 year old male admitted for Benzo/Opioids withdrawal. 
Patient completed his 4 days Subutex taper and continue with 4 Valium taper tolerating well. 
Per endorsement received PRN Clonidine, Vistaril, effective per night nurse, Last CIWA-10, 
COWS-12, slept for 4 hours. Received patient asleep responsive to verbal and tactile 
stimuli. Breathing normal no SOB noted. Skin intact warm and dry to touch.  All safety 
measures in place. Will cont with plan of care.

## 2018-10-29 NOTE — NUR
Start of shift note 

Received report from day shift nurse. Patient is a 38 year old male admitted for Heroin and 
Benzodiazepine withdrawal. Patient is medically cleared to be discharge tomorrow. Patient 
completed Valium and Subutex taper. Patient did not require PRN medication. Last COWS 5 and 
CIWA 6. Patient at the group at this time. Patient's room dirty, bed is messy and clothes 
everywhere in the room. Safety measures in place. Call light in reach. Will continue to 
monitor

## 2018-10-29 NOTE — NUR
PRN Robaxin, Vistaril and Benadryl re-assessment 

Patient lying in bed  with eyes closed. Respiration even and unlabored. No facial grimacing. 
Will continue to monitor.

## 2018-10-29 NOTE — NUR
Reassess PRN Clonidine/Vistaril



Medication effective. Pt resting with eyes closed. Respirations are even and unlabored. 
Continue to monitor.

## 2018-10-29 NOTE — NUR
COWS and CIWA assessment 

Patient reports anxiety, restlessness , generalized body aches and intermittent sweating. 
COWS 7 and CIWA 5.

## 2018-10-29 NOTE — NUR
End of Shift



Endorsing 38 year old male patient admitted 10/24/18 to [facility] for 
medically supervised withdrawal from Benzodiazepines and Opiates. Pt completed a 4 day 
Valium taper 10/28/18 which he is tolerated well. Last CIWA 10 and COWS 12 @0000. Pt 
received PRN Clonidine and Vistaril on night shift. PO intake 736 ml, voided x1, BM x 0, and 
slept 4 hours. Bed is low, side rails up x 2, and call bell in reach.

## 2018-10-30 VITALS — DIASTOLIC BLOOD PRESSURE: 54 MMHG | SYSTOLIC BLOOD PRESSURE: 98 MMHG

## 2018-10-30 RX ADMIN — OXCARBAZEPINE SCH MG: 300 TABLET, FILM COATED ORAL at 08:41

## 2018-10-30 RX ADMIN — GABAPENTIN SCH MG: 400 CAPSULE ORAL at 08:41

## 2018-10-30 RX ADMIN — THERA TABS SCH UDTAB: TAB at 08:41

## 2018-10-30 NOTE — NUR
START OF SHIFT

Pt is a 38 yr old male, AA&Ox4. pt was admitted on 10/24/18 for Benzo/Opiate withdrawal and 
completed a 4 day Valium and 3 day Subutex taper as ordered. Received report from night 
shift nurse. Pt was given Benadryl PRN, Robaxin PRN and Vistaril PRN during the night. 
Medication was effective. Pt slept for 6 hrs. Last COWS score was 7 and CIWA score was 5. Pt 
is to be discharged today to [facility]. Pt states of feeling anxious due to 
discharged but is able to cope with anxiety level. Skin is intact, warm and moist to touch. 
Safety precautions observed. Call light is within reach. Will continue to monitor.

## 2018-10-30 NOTE — NUR
DISCHARGE NOTE

Pt is a 38 yr old male, AA&Ox4. Pt was admitting on 10/24/18 for Benzo/Opiate withdrawal and 
completed a 4 day Valium taper and 3 day Subutex taper. Pt was cooperative with medication 
regimen and plan of care. Pt was attending group therapy. pt was c/o anxiety due to 
discharged but states he is able to cope with anxiety level. Pt was educated on discharged 
summary and prescriptions. Pt was able to verbalize understanding. Pt was escorted off the 
unit at 0948 in stable condition. Pt was discharged to [facility]. Pt left with all 
belongings and valuables. No home medications was brought.

## 2018-10-30 NOTE — NUR
End of shift note 

Patient slept 6 hours. Fluid intake 575 ml. Voided x 2. BM x 1. Patient is medically cleared 
to be discharge today. Patient completed Valium and Subutex taper. Patient reported  
anxiety, restlessness , generalized body aches and intermittent sweating.  Patient was given 
one time Benadryl for difficulty falling asleep, Vistaril for anxiety due to his discharge 
and Robaxin for generalized body aches. Safety measures in place. Call light in reach. Will 
continue to monitor. Last COWS 7 and CIWA  5.

## 2018-10-30 NOTE — NUR
COWS and CIWA deferred 

Patient lying in bed with eyes closed. Respiration even and unlabored. VS refused. Will 
continue to monitor